# Patient Record
(demographics unavailable — no encounter records)

---

## 2024-11-27 NOTE — ASSESSMENT
[FreeTextEntry1] : Recommending an MRI left knee rule out tear.  He was wrapped with an Ace bandage she will continue with crutches.  Icing, rest, anti-inflammatory.  No gym class or sports.  Follow-up after MRI is completed.

## 2024-11-27 NOTE — HISTORY OF PRESENT ILLNESS
[de-identified] : 14-year-old male comes in today with his mom for an evaluation of his left knee pain and injury that occurred while boxing and he twisted the left knee this occurred on November 22.  He went to [facility] urgent care had an x-ray done.  Has been wrapping it with an Ace bandage, using crutches.  Takes Tylenol.

## 2024-11-27 NOTE — IMAGING
[de-identified] : On examination of the left knee swelling compared to the right.,  No ecchymosis, no erythema.  Skin is intact.  Tenderness noted along the medial lateral joint line.  Tenderness along the medial lateral collateral ligament.  There was not 1 specific area of point tenderness.  No tenderness of the patella, patella tendon or quadricep tendon.  He is able to straight leg raise.  He has restriction and pain through terminal knee flexion and extension.  Felt stable to stress testing.  Pain with Yanely's.  Ambulating with crutches.  [facility] left knee no obvious fracture or dislocation, questionable cortical defect lateral border distal femur possibly consistent with a nonossifying fibroma being on oblique view.  We do not have x-ray report from urgent care

## 2024-12-10 NOTE — PHYSICAL EXAM
[Not Examined] : not examined [Normal] : The patient is moving all extremities spontaneously without any gross neurologic deficits. They walk with a fluid nonantalgic gait. There are equal and symmetric deep tendon reflexes in the upper and lower extremities bilaterally. There is gross intact sensation to soft and light touch in the bilateral upper and lower extremities [de-identified] :  ISLT Intact Motor ACL feels good

## 2024-12-10 NOTE — HISTORY OF PRESENT ILLNESS
[FreeTextEntry1] : 15 y/o male with strain of left knee had MRI done which shows no meniscal tear, mild ACL sprain but not full tear, bone bruising.

## 2025-04-16 NOTE — HISTORY OF PRESENT ILLNESS
[de-identified] : 14-year-old male comes in today with his mom for evaluation of his left ring finger pain and injury that occurred on April 14.  Patient was playing football with his brothers and jammed the finger.  Went to [facility] was placed into a splint told he had a fracture.

## 2025-04-16 NOTE — IMAGING
[de-identified] : Admission of the left ring finger swelling noted, ecchymosis present.  Skin intact, nail intact.  Tenderness over the PIP joint and DIP joint.  Most tenderness over the PIP.  No tenderness over the base proximal phalanx.  No tenderness over the metacarpals.  He is able to flex and extend at the DIP and PIP joint although with restriction and pain.  No hyperextension at the DIP.  [facility] with repeat x-ray in the office today left ring finger 3 views probable buckle fracture middle phalanx seen best on lateral view

## 2025-04-16 NOTE — ASSESSMENT
[FreeTextEntry1] : Impression today is fracture dorsal middle phalanx.  Patient placed into an AlumaFoam splint in extension.  Splint to remain on at all times.  We discussed no gym class, no sports.  Follow-up in 2 weeks for repeat finger x-ray and evaluation

## 2025-04-28 NOTE — HISTORY OF PRESENT ILLNESS
[de-identified] : 14-year-old male had injury to his left ring finger.  Comes in today for evaluation.  He has no significant complaints.  Does have mild swelling present in the finger.  He is wearing a splint.

## 2025-04-28 NOTE — DATA REVIEWED
[FreeTextEntry1] : Radiographs 3 views of the left ring finger reviewed showing a well aligned ring finger middle phalanx fracture.

## 2025-04-28 NOTE — ASSESSMENT
[FreeTextEntry1] : Patient left ring finger middle phalanx fracture.  Treating it like a growth plate injury.  He will continue to do range of motion exercises and stretching exercises.  He will see me back on a as needed basis.  In 2 weeks he should be able to turn back to sporting activities.

## 2025-04-28 NOTE — PHYSICAL EXAM
[de-identified] : Patient is great range of motion to the left ring finger.  Incomplete not full but near full.  There is some swelling along the PIP joint.  There is no erythema ecchymoses or abrasions.